# Patient Record
Sex: FEMALE
[De-identification: names, ages, dates, MRNs, and addresses within clinical notes are randomized per-mention and may not be internally consistent; named-entity substitution may affect disease eponyms.]

---
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
## 2025-03-06 ENCOUNTER — HOSPITAL ENCOUNTER (EMERGENCY)
Dept: HOSPITAL 90 - EDH | Age: 20
Discharge: HOME | End: 2025-03-06
Payer: MEDICAID

## 2025-03-06 VITALS
HEART RATE: 83 BPM | OXYGEN SATURATION: 99 % | TEMPERATURE: 98.6 F | RESPIRATION RATE: 20 BRPM | SYSTOLIC BLOOD PRESSURE: 119 MMHG | DIASTOLIC BLOOD PRESSURE: 81 MMHG

## 2025-03-06 VITALS — WEIGHT: 205.03 LBS | BODY MASS INDEX: 37.73 KG/M2 | HEIGHT: 62 IN

## 2025-03-06 DIAGNOSIS — R06.02: ICD-10-CM

## 2025-03-06 DIAGNOSIS — Z20.822: ICD-10-CM

## 2025-03-06 DIAGNOSIS — Z20.828: ICD-10-CM

## 2025-03-06 DIAGNOSIS — R51.9: ICD-10-CM

## 2025-03-06 DIAGNOSIS — R53.1: ICD-10-CM

## 2025-03-06 DIAGNOSIS — H53.8: ICD-10-CM

## 2025-03-06 DIAGNOSIS — J02.9: Primary | ICD-10-CM

## 2025-03-06 LAB — SARS-COV-2 AG RESP QL IA.RAPID: (no result)

## 2025-03-06 PROCEDURE — 87880 STREP A ASSAY W/OPTIC: CPT

## 2025-03-06 PROCEDURE — 99283 EMERGENCY DEPT VISIT LOW MDM: CPT

## 2025-03-06 PROCEDURE — 87804 INFLUENZA ASSAY W/OPTIC: CPT

## 2025-03-06 PROCEDURE — 87426 SARSCOV CORONAVIRUS AG IA: CPT

## 2025-03-06 NOTE — ERN
ED Note


History of Present Illness


Stated Complaint:  BLURRED VISION, SOB, WEAK


Chief Complaint:  Headache


Time Seen by MD:  16:50


Dictation:


Patient is a 19-year-old female here with complaints of flu-like symptoms to 

include frontal headache, clear rhinitis with sore throat painful swallowing.  

She states she is also having ear pain to the left.  Body aches.  No fever no 

chills no nausea vomiting.  She states she went on a trip with girls this 

weekend and several of the girls came back and were positive for influenza.  

She has taken nothing today prior to arrival for her symptoms no primary care 

doctor.


Allergies:  


Coded Allergies:  


     No Known Allergies (Unverified  Allergy, Unknown, 9/6/23)


Home Meds


Active Scripts


Oseltamivir Phosphate (Tamiflu) 75 Mg Cap, 75 MG PO BID for 5 Days, #10 CAP


   Prov:FESTUS CHEUNG NP         3/6/25


Amoxicillin/Potassium Clav (Amox Tr-K Clv 875-125 mg Tab) 875 Mg-125 Mg Tablet, 

1 EACH PO BID for 7 Days, #14 TAB 0 Refills


   Prov:FESTUS CHEUNG NP         3/6/25


Meclizine HCl (Meclizine HCl) 25 Mg Tablet, 25 MG PO TID PRN for DIZZINESS for 7

Days, #21 TAB 0 Refills


   Prov:RENETTA POST DO         9/6/23





Past Medical History


Past Medical History:  No Pertinent History


Surgical History:  None


Social History:  Negative


Pregnancy History:  Not Applicable


RN Note Reviewed/Agreed w/[facility]:  Yes





Review of System


Dictation


CONSTITUTIONAL:  Negative except for HPI fever chills


HEAD/FACE:  Negative except for HPI


EENT:  Negative except for HPI frontal headache, left ear pain, sinus congestion

with sore throat


RESPIRATORY: Negative except for HPI


GASTROINTESTINAL/ABDOMINAL:   Negative except for HPI


GENITOURINARY: Negative except for HPI


MUSCULOSKELETAL: Negative except for HPI


INTEGUMENTARY:  Negative except for HPI


NEUROLOGICAL/PSYCH: Negative except for HPI


HEMATOLOGIC/LYMPHATIC:  Negative except for HPI





All Systems Negative, Except as noted above.





13 point review of systems assessed and all negative except for above.





Initial Vital Sign


VS





                                   Vital Signs








  Date Time  Temp Pulse Resp B/P (MAP) Pulse Ox O2 Delivery O2 Flow Rate FiO2


 


3/6/25 16:50 98.6 83 20 119/81 99 Room Air 0 


 


3/6/25 17:04        21











Physical Exam


Dictation


Vital Signs reviewed 


General Appearance: Alert, oriented x 3, no acute distress, well developed, 

nourished. 


Head and Face: non-traumatic.


Eyes: PERRL, pink conjunctivas, eyelid no trauma, anterior chamber with arcus 

senilis. 


Ears: Pinnas intact and no signs of trauma or erythema left otic canal with 

erythema swelling.


Nose: No discharge, no bleeding. 


Oropharynx: Mo moderate pharyngeal erythema, tonsils no exudates, no abscesses 

noted,  mucous membrane moist uvula midline, voice is clear


Neck: Supple, non-tender, no thyromegaly, no masses, no JVD, no bruits 


Breast:Deferred 


Chest:No tenderness, no crepitus, no paradoxical movement, no retractions 


Lungs:Clear, well-ventilated, symmetric, no rales, no wheezing, no rhonchi, no 

stridor, good breath sounds bilaterally 


Heart: Regular rate, regular rhythm, no murmur, no gallops 


Vascular: no peripheral edema, 


Abdomen: Soft, positive bowel sounds, nondistended, no guarding, 


nontender, no rebound, no masses no hepatomegaly, no splenomegaly, no Burns's 

sign, no hernias.


Rectal: Deferred


Genital: Deferred


Neurological: Normal speech,  motor function intact, sensory function intact 


Musculoskeletal: Neck nontender, full range of motion, back nontender, full 

range of motion,


Extremities: nontender, full range of motion 


Skin: Color pink, dry, no turgor, no rash, no lacerations, no abrasions, no 

contusions.


Lymphatic: Deferred





Results (Laboratory/Radiology)


Laboratory/Radiology





Labs Reviewed?:  Yes





ED Course


ED Course


1810/patient negative for flu COVID and strep.  Discharged home with a acute 

pharyngitis unspecified and exposure to influenza.





Medical Decision Making


MDM


Medical discharge making based on swabs for flu COVID and strep.


Patient treated for an acute sinus headache and feels better after treatment


Discharged home on antibiotics for acute pharyngitis unspecified


She will be given Tamiflu for influenza exposure.





DX & DISP


Disposition:  Discharge


Departure


Impression:  


   Primary Impression:  Acute pharyngitis, unspecified


   Additional Impressions:  Exposure to influenza, Sinus headache


Condition:  Stable


Scripts


Oseltamivir Phosphate (Tamiflu) 75 Mg Cap


75 MG PO BID for 5 Days, #10 CAP


   Prov: FESTUS CHEUNG NP         3/6/25 


Amoxicillin/Potassium Clav (Amox Tr-K Clv 875-125 mg Tab) 875 Mg-125 Mg Tablet


1 EACH PO BID for 7 Days, #14 TAB 0 Refills


   Prov: FESTUS CHEUNG NP         3/6/25





Additional Instructions:  


Follow-up with primary care provider in 1 to 2 days.  Take medications as 

directed here in the emergency room.  Okay to continue home medications unless 

otherwise discussed during your visit in the emergency room today.  Return to 

your nearest emergency room if symptoms worsen or if there is no improvement.  

Call 911 if you need immediate assistance.  Take Tylenol or Motrin over-the-co

unter as needed and if no contraindications are present.  Increase oral 

hydration.  A wound culture or urine culture was ordered here in the emergency 

room department please follow-up with primary care provider and advise them to 

get repeat ports from our facility.  If you had any Ace wrap/splints that were 

applied here, please do not remove them until you see your primary care or 

specialty.





Take antibiotics and Tamiflu as directed until gone. , increase your water 

intake. , take Tylenol 1000 mg every 6-8 hours/over-the-counter as needed for 

headache or pain.  No work or school until Monday 03/10/2025


Referrals:  


NONE (PCP)


Time of Disposition:  18:12


I have reviewed the case, and I agree with, Diagnosis and Plan


I performed the substantive portion of the visit.  I have reviewed and 

personally made and approve the management plan that is documented in the notes 

by myself or the [facility].  I acknowledge full responsibility for the patient's 

management plan.











FESTUS CHEUNG NP               Mar 6, 2025 17:20


SHERIE MONTERO MD       Mar 9, 2025 18:46